# Patient Record
Sex: MALE | Race: OTHER | HISPANIC OR LATINO | URBAN - METROPOLITAN AREA
[De-identification: names, ages, dates, MRNs, and addresses within clinical notes are randomized per-mention and may not be internally consistent; named-entity substitution may affect disease eponyms.]

---

## 2022-02-14 ENCOUNTER — EMERGENCY (EMERGENCY)
Facility: HOSPITAL | Age: 22
LOS: 0 days | Discharge: HOME | End: 2022-02-14
Attending: EMERGENCY MEDICINE | Admitting: EMERGENCY MEDICINE
Payer: COMMERCIAL

## 2022-02-14 VITALS
SYSTOLIC BLOOD PRESSURE: 134 MMHG | DIASTOLIC BLOOD PRESSURE: 73 MMHG | HEART RATE: 85 BPM | RESPIRATION RATE: 17 BRPM | OXYGEN SATURATION: 100 % | WEIGHT: 164.91 LBS | TEMPERATURE: 98 F

## 2022-02-14 DIAGNOSIS — K08.89 OTHER SPECIFIED DISORDERS OF TEETH AND SUPPORTING STRUCTURES: ICD-10-CM

## 2022-02-14 DIAGNOSIS — K02.9 DENTAL CARIES, UNSPECIFIED: ICD-10-CM

## 2022-02-14 PROCEDURE — 99282 EMERGENCY DEPT VISIT SF MDM: CPT

## 2022-02-14 NOTE — ED PROVIDER NOTE - PHYSICAL EXAMINATION
CONSTITUTIONAL:  NAD  SKIN:  warm, dry  HEAD:  NCAT  EYES:  NL inspection  ENT:  + tooth 14 with caries, no gingival erythema/edema, posterior oropharynx clear w/o erythema/edema/exudates, uvula midline, MMM  NECK:  no lymphadenopathy, NL ROM, no deformity  RESP:  symmetric chest rise, no increased work of breathing  MSK:  good active ROM x4 extremities; no deformity  NEURO:  grossly unremarkable  PSYCH:  cooperative, appropriate

## 2022-02-14 NOTE — CONSULT NOTE ADULT - SUBJECTIVE AND OBJECTIVE BOX
Patient is a 21y old  Male who presents with a chief complaint of having toothache in the upper right side     HPI:      PAST MEDICAL & SURGICAL HISTORY:    (   ) heart valve replacement  (   ) joint replacement  (   ) pregnancy    MEDICATIONS  (STANDING):    MEDICATIONS  (PRN):      Allergies      Intolerances        FAMILY HISTORY:      *SOCIAL HISTORY: (   ) Tobacco; (   ) ETOH    *Last Dental Visit:    Vital Signs Last 24 Hrs  T(C): 36.7 (14 Feb 2022 15:53), Max: 36.7 (14 Feb 2022 15:53)  T(F): 98 (14 Feb 2022 15:53), Max: 98 (14 Feb 2022 15:53)  HR: 85 (14 Feb 2022 15:53) (85 - 85)  BP: 134/73 (14 Feb 2022 15:53) (134/73 - 134/73)  BP(mean): --  RR: 17 (14 Feb 2022 15:53) (17 - 17)  SpO2: 100% (14 Feb 2022 15:53) (100% - 100%)    LABS:                  EOE:  TMJ ( -  ) clicks                     ( -  ) pops                     (  - ) crepitus             Mandible <<FROM>>             Facial bones and MOM <<grossly intact>>             (-  ) trismus             ( - ) lymphadenopathy             ( -) swelling             ( -) asymmetry             ( -) palpation             ( - ) dyspnea             ( -  ) dysphagia             ( -  ) loss of consciousness    IOE:  <<permanent>> dentition:  <<multiple carious teeth>>           hard/soft palate:  ( -  ) palatal torus, <<No pathology noted>>           tongue/FOM <<No pathology noted>>           labial/buccal mucosa <<No pathology noted>>           ( -  ) percussion           ( -  ) palpation           ( -  ) swelling            ( -  ) abscess           ( -  ) sinus tract    Dentition present: <<   >>  Mobility: <<  >>  Caries: <<   >>         *DENTAL RADIOGRAPHS: 1 periapical radiograph    RADIOLOGY & ADDITIONAL STUDIES:    *ASSESSMENT: Extraoral evaluations shows no facial swelling or lymphadenopathy. patient has no trouble breathing or swallowing. #14 has occlusal caries.   #14 has symptomatic irreversible pulpitis and salvageable. Patient decides to save the tooth. he is prescribed antibiotics and analgesics and recommended to follow up with a dentist for further assessment and treatment #14.        RECOMMENDATIONS:  1) <<1- Amoxicillin 500mg, q8h x 7 days  2- Ibuprofen 600mg, q6h x 5days taken with meal>>  2) Dental F/U with outpatient dentist for comprehensive dental care.   3) If any difficulty swallowing/breathing, fever occur, return to ER.     Emily Llanos, pager #[phone]

## 2022-02-14 NOTE — ED PROVIDER NOTE - OBJECTIVE STATEMENT
Pt is a 21M with no pmhx p/w lower right 2nd molar dental pain x1wk, initially mild and worsening over past 3-4 days. Pain is nonradiating, no associated f/c/malaise, n/v/d, sore throat, difficulty swallowing, ear pain/fullness, cough or SOB. Pt is a 21M with no pmhx p/w upper right 1st molar dental pain x1wk, initially mild and worsening over past 3-4 days. Pain is nonradiating, no associated f/c/malaise, n/v/d, sore throat, difficulty swallowing, ear pain/fullness, cough or SOB.

## 2022-02-14 NOTE — ED PROVIDER NOTE - NS ED ROS FT
CONSTITUTIONAL:  see HPI  EYES:  no injury or acute visual changes  ENMT: + dental pain as per HPI, no sore throat, no ear pain or fullness, no runny nose, no acute change in hearing or difficulty swallowing  CARD:  no chest pain  RESP:  no cough or respiratory distress  ABD:  no nausea, vomiting, diarrhea or abdominal pain  Except as documented in the HPI,  all other systems are negative

## 2022-02-14 NOTE — ED ADULT NURSE NOTE - OBJECTIVE STATEMENT
Pt is a 21M with no pmhx p/w lower right 2nd molar dental pain x1wk, initially mild and worsening over past 3-4 days

## 2023-06-09 NOTE — ED ADULT TRIAGE NOTE - INTERNATIONAL TRAVEL
Pt requesting water at this time, updated on continued NPO status until cleared by ERP. Pt verbalizes understanding of NPO status.   No